# Patient Record
Sex: FEMALE | Race: BLACK OR AFRICAN AMERICAN | Employment: FULL TIME | URBAN - METROPOLITAN AREA
[De-identification: names, ages, dates, MRNs, and addresses within clinical notes are randomized per-mention and may not be internally consistent; named-entity substitution may affect disease eponyms.]

---

## 2017-03-03 NOTE — TELEPHONE ENCOUNTER
please advise as does not meet RN protocol for refill criteria - lab results not on file.         Hypertensive Medications  Protocol Criteria:  · Appointment scheduled in the past 6 months or in the next 3 months  · BMP or CMP in the past 12 months  · Crea

## 2017-03-05 RX ORDER — AMLODIPINE BESYLATE 2.5 MG/1
TABLET ORAL
Qty: 90 TABLET | Refills: 1 | Status: SHIPPED | OUTPATIENT
Start: 2017-03-05 | End: 2017-10-09

## 2017-09-11 ENCOUNTER — OFFICE VISIT (OUTPATIENT)
Dept: INTERNAL MEDICINE CLINIC | Facility: CLINIC | Age: 57
End: 2017-09-11

## 2017-09-11 VITALS
HEART RATE: 71 BPM | SYSTOLIC BLOOD PRESSURE: 135 MMHG | DIASTOLIC BLOOD PRESSURE: 85 MMHG | HEIGHT: 64 IN | BODY MASS INDEX: 24.61 KG/M2 | WEIGHT: 144.13 LBS | TEMPERATURE: 98 F

## 2017-09-11 DIAGNOSIS — Z00.00 ANNUAL PHYSICAL EXAM: Primary | ICD-10-CM

## 2017-09-11 PROCEDURE — 99396 PREV VISIT EST AGE 40-64: CPT | Performed by: INTERNAL MEDICINE

## 2017-09-11 NOTE — PROGRESS NOTES
HPI:    Patient ID: Rufina Sanchez is a 56year old female. HPI  Annual Preventative Exam  Patient arrives today for annual preventative physical examination. The patient complains of no new problems and has 0/10 pain.        Review of Systems   Consti exhibits no tenderness. Abdominal: Soft. Bowel sounds are normal. She exhibits no distension and no mass. There is no tenderness. There is no rebound and no guarding. Musculoskeletal: Normal range of motion.  She exhibits no edema (lower extermities , + performance and is accurate and complete.   Alison Cisneros MD, 9/11/2017, 12:29 PM

## 2017-10-11 RX ORDER — AMLODIPINE BESYLATE 2.5 MG/1
TABLET ORAL
Qty: 90 TABLET | Refills: 0 | Status: SHIPPED | OUTPATIENT
Start: 2017-10-11 | End: 2018-01-02

## 2018-01-05 NOTE — TELEPHONE ENCOUNTER
Call Center please assist with appt scheduling. Patient needs to schedule office visit to establish care with new provider for any additional refills. Also no lab work on file. Labs were ordered in September but never completed.       Hypertensive Medicatio

## 2018-01-06 NOTE — TELEPHONE ENCOUNTER
Patient has scheduled visit with Dr. Kory Jerez for 01/27/2018 to establish care. Patient was not able to schedule for earlier date due to work conflicts.      Patient is almost out of medication

## 2018-01-07 RX ORDER — AMLODIPINE BESYLATE 2.5 MG/1
TABLET ORAL
Qty: 30 TABLET | Refills: 0 | Status: SHIPPED | OUTPATIENT
Start: 2018-01-07 | End: 2018-01-27

## 2018-01-27 ENCOUNTER — OFFICE VISIT (OUTPATIENT)
Dept: INTERNAL MEDICINE CLINIC | Facility: CLINIC | Age: 58
End: 2018-01-27

## 2018-01-27 ENCOUNTER — LAB ENCOUNTER (OUTPATIENT)
Dept: LAB | Facility: HOSPITAL | Age: 58
End: 2018-01-27
Attending: INTERNAL MEDICINE
Payer: COMMERCIAL

## 2018-01-27 VITALS
DIASTOLIC BLOOD PRESSURE: 88 MMHG | TEMPERATURE: 98 F | SYSTOLIC BLOOD PRESSURE: 124 MMHG | HEART RATE: 71 BPM | HEIGHT: 64 IN | BODY MASS INDEX: 24.59 KG/M2 | WEIGHT: 144 LBS

## 2018-01-27 DIAGNOSIS — Z00.00 ANNUAL PHYSICAL EXAM: ICD-10-CM

## 2018-01-27 DIAGNOSIS — I10 ESSENTIAL HYPERTENSION: Primary | ICD-10-CM

## 2018-01-27 LAB
ALBUMIN SERPL BCP-MCNC: 4.6 G/DL (ref 3.5–4.8)
ALBUMIN/GLOB SERPL: 1.4 {RATIO} (ref 1–2)
ALP SERPL-CCNC: 61 U/L (ref 32–100)
ALT SERPL-CCNC: 27 U/L (ref 14–54)
ANION GAP SERPL CALC-SCNC: 7 MMOL/L (ref 0–18)
AST SERPL-CCNC: 22 U/L (ref 15–41)
BASOPHILS # BLD: 0 K/UL (ref 0–0.2)
BASOPHILS NFR BLD: 1 %
BILIRUB SERPL-MCNC: 0.8 MG/DL (ref 0.3–1.2)
BUN SERPL-MCNC: 13 MG/DL (ref 8–20)
BUN/CREAT SERPL: 17.6 (ref 10–20)
CALCIUM SERPL-MCNC: 10 MG/DL (ref 8.5–10.5)
CHLORIDE SERPL-SCNC: 107 MMOL/L (ref 95–110)
CO2 SERPL-SCNC: 26 MMOL/L (ref 22–32)
CREAT SERPL-MCNC: 0.74 MG/DL (ref 0.5–1.5)
CREAT UR-MCNC: 141.7 MG/DL
EOSINOPHIL # BLD: 0.1 K/UL (ref 0–0.7)
EOSINOPHIL NFR BLD: 2 %
ERYTHROCYTE [DISTWIDTH] IN BLOOD BY AUTOMATED COUNT: 14.1 % (ref 11–15)
GLOBULIN PLAS-MCNC: 3.2 G/DL (ref 2.5–3.7)
GLUCOSE SERPL-MCNC: 112 MG/DL (ref 70–99)
HBA1C MFR BLD: 6.4 % (ref 4–6)
HCT VFR BLD AUTO: 44.3 % (ref 35–48)
HGB BLD-MCNC: 14.5 G/DL (ref 12–16)
LYMPHOCYTES # BLD: 3 K/UL (ref 1–4)
LYMPHOCYTES NFR BLD: 49 %
MCH RBC QN AUTO: 27.4 PG (ref 27–32)
MCHC RBC AUTO-ENTMCNC: 32.8 G/DL (ref 32–37)
MCV RBC AUTO: 83.5 FL (ref 80–100)
MICROALBUMIN UR-MCNC: 0.4 MG/DL (ref 0–1.8)
MICROALBUMIN/CREAT UR: 2.8 MG/G{CREAT} (ref 0–20)
MONOCYTES # BLD: 0.5 K/UL (ref 0–1)
MONOCYTES NFR BLD: 8 %
NEUTROPHILS # BLD AUTO: 2.5 K/UL (ref 1.8–7.7)
NEUTROPHILS NFR BLD: 41 %
OSMOLALITY UR CALC.SUM OF ELEC: 291 MOSM/KG (ref 275–295)
PLATELET # BLD AUTO: 203 K/UL (ref 140–400)
PMV BLD AUTO: 8.5 FL (ref 7.4–10.3)
POTASSIUM SERPL-SCNC: 4.1 MMOL/L (ref 3.3–5.1)
PROT SERPL-MCNC: 7.8 G/DL (ref 5.9–8.4)
RBC # BLD AUTO: 5.3 M/UL (ref 3.7–5.4)
SODIUM SERPL-SCNC: 140 MMOL/L (ref 136–144)
TSH SERPL-ACNC: 1.27 UIU/ML (ref 0.45–5.33)
WBC # BLD AUTO: 6.1 K/UL (ref 4–11)

## 2018-01-27 PROCEDURE — 85025 COMPLETE CBC W/AUTO DIFF WBC: CPT

## 2018-01-27 PROCEDURE — 99213 OFFICE O/P EST LOW 20 MIN: CPT | Performed by: INTERNAL MEDICINE

## 2018-01-27 PROCEDURE — 82306 VITAMIN D 25 HYDROXY: CPT

## 2018-01-27 PROCEDURE — 99212 OFFICE O/P EST SF 10 MIN: CPT | Performed by: INTERNAL MEDICINE

## 2018-01-27 PROCEDURE — 83036 HEMOGLOBIN GLYCOSYLATED A1C: CPT

## 2018-01-27 PROCEDURE — 36415 COLL VENOUS BLD VENIPUNCTURE: CPT

## 2018-01-27 PROCEDURE — 80053 COMPREHEN METABOLIC PANEL: CPT

## 2018-01-27 PROCEDURE — 84443 ASSAY THYROID STIM HORMONE: CPT

## 2018-01-27 PROCEDURE — 82570 ASSAY OF URINE CREATININE: CPT

## 2018-01-27 PROCEDURE — 82043 UR ALBUMIN QUANTITATIVE: CPT

## 2018-01-27 RX ORDER — AMLODIPINE BESYLATE 2.5 MG/1
2.5 TABLET ORAL
Qty: 90 TABLET | Refills: 1 | Status: SHIPPED | OUTPATIENT
Start: 2018-01-27 | End: 2019-02-18

## 2018-01-27 NOTE — PROGRESS NOTES
Lokesh Torres is a 56year old female. Patient presents with:  Hypertension      HPI:   68-year-old pleasant female here to establish care. Former patient of Dr. Leatha Gallgaher. She has  history of hypertension. She is on amlodipine 2.5 mg daily.  She reque polyuria, polydipsia, tremors. HEME: no anemia, no abnormal bleeding or easy bruising. PSYCH: Denies anxiety or feeling depressed.         EXAM:   /88 (BP Location: Right arm, Cuff Size: adult)   Pulse 71   Temp 98 °F (36.7 °C) (Oral)   Ht 5' 4\" (1

## 2018-01-29 PROBLEM — R73.03 PREDIABETES: Status: ACTIVE | Noted: 2018-01-29

## 2018-01-29 LAB — 25(OH)D3 SERPL-MCNC: 42.4 NG/ML

## 2018-08-14 NOTE — TELEPHONE ENCOUNTER
Failed protocol, please advise.   IN    Hypertensive Medications  Protocol Criteria:  · Appointment scheduled in the past 6 months or in the next 3 months  · BMP or CMP in the past 12 months  · Creatinine result < 2  Recent Outpatient Visits

## 2018-08-15 RX ORDER — AMLODIPINE BESYLATE 2.5 MG/1
TABLET ORAL
Qty: 90 TABLET | Refills: 0 | Status: SHIPPED | OUTPATIENT
Start: 2018-08-15 | End: 2018-11-15

## 2018-11-14 NOTE — TELEPHONE ENCOUNTER
Current Outpatient Medications:  AMLODIPINE BESYLATE 2.5 MG Oral Tab TAKE 1 TABLET BY MOUTH EVERY DAY Disp: 90 tablet Rfl: 0   e

## 2018-11-15 RX ORDER — AMLODIPINE BESYLATE 2.5 MG/1
2.5 TABLET ORAL
Qty: 90 TABLET | Refills: 0 | Status: SHIPPED | OUTPATIENT
Start: 2018-11-15 | End: 2018-11-19

## 2018-11-15 NOTE — TELEPHONE ENCOUNTER
Please review; protocol failed.     Hypertensive Medications  Protocol Criteria:  · Appointment scheduled in the past 6 months or in the next 3 months  · BMP or CMP in the past 12 months  · Creatinine result < 2  Recent Outpatient Visits            9 months

## 2018-11-19 ENCOUNTER — TELEPHONE (OUTPATIENT)
Dept: OTHER | Age: 58
End: 2018-11-19

## 2018-11-19 RX ORDER — AMLODIPINE BESYLATE 2.5 MG/1
2.5 TABLET ORAL
Qty: 90 TABLET | Refills: 0 | Status: SHIPPED | OUTPATIENT
Start: 2018-11-19 | End: 2019-02-10

## 2019-02-12 RX ORDER — AMLODIPINE BESYLATE 2.5 MG/1
TABLET ORAL
Qty: 90 TABLET | Refills: 0 | Status: SHIPPED | OUTPATIENT
Start: 2019-02-12 | End: 2019-05-12

## 2019-02-12 NOTE — TELEPHONE ENCOUNTER
Review pended refill request as it does not fall under a protocol.     Last Rx:11-19-18  LOV: 1-27-18

## 2019-02-13 ENCOUNTER — TELEPHONE (OUTPATIENT)
Dept: INTERNAL MEDICINE CLINIC | Facility: CLINIC | Age: 59
End: 2019-02-13

## 2019-02-13 DIAGNOSIS — R73.03 PREDIABETES: Primary | ICD-10-CM

## 2019-02-13 DIAGNOSIS — I10 ESSENTIAL HYPERTENSION: ICD-10-CM

## 2019-02-13 DIAGNOSIS — Z00.00 ANNUAL PHYSICAL EXAM: ICD-10-CM

## 2019-02-13 NOTE — TELEPHONE ENCOUNTER
Pt requesting blood work orders before Px on 2/18/19    Please confirm if pt needs to fast, and let her know when the orders have been placed.     Call back number [phone]

## 2019-02-18 ENCOUNTER — OFFICE VISIT (OUTPATIENT)
Dept: INTERNAL MEDICINE CLINIC | Facility: CLINIC | Age: 59
End: 2019-02-18
Payer: COMMERCIAL

## 2019-02-18 ENCOUNTER — LAB ENCOUNTER (OUTPATIENT)
Dept: LAB | Facility: HOSPITAL | Age: 59
End: 2019-02-18
Attending: INTERNAL MEDICINE
Payer: COMMERCIAL

## 2019-02-18 VITALS
SYSTOLIC BLOOD PRESSURE: 147 MMHG | DIASTOLIC BLOOD PRESSURE: 86 MMHG | TEMPERATURE: 98 F | HEART RATE: 82 BPM | BODY MASS INDEX: 25.04 KG/M2 | WEIGHT: 150.31 LBS | HEIGHT: 65 IN

## 2019-02-18 DIAGNOSIS — Z00.00 ANNUAL PHYSICAL EXAM: Primary | ICD-10-CM

## 2019-02-18 DIAGNOSIS — Z00.00 ANNUAL PHYSICAL EXAM: ICD-10-CM

## 2019-02-18 DIAGNOSIS — I10 ESSENTIAL HYPERTENSION: ICD-10-CM

## 2019-02-18 DIAGNOSIS — R73.03 PREDIABETES: ICD-10-CM

## 2019-02-18 DIAGNOSIS — Z12.4 SCREENING FOR CERVICAL CANCER: ICD-10-CM

## 2019-02-18 DIAGNOSIS — Z12.39 SCREENING FOR BREAST CANCER: ICD-10-CM

## 2019-02-18 DIAGNOSIS — Z23 NEED FOR VACCINATION: ICD-10-CM

## 2019-02-18 PROBLEM — Z90.2 S/P PARTIAL LOBECTOMY OF LUNG: Status: ACTIVE | Noted: 2019-02-18

## 2019-02-18 LAB
ALBUMIN SERPL-MCNC: 4.3 G/DL (ref 3.4–5)
ALBUMIN/GLOB SERPL: 1.1 {RATIO} (ref 1–2)
ALP LIVER SERPL-CCNC: 66 U/L (ref 46–118)
ALT SERPL-CCNC: 28 U/L (ref 13–56)
ANION GAP SERPL CALC-SCNC: 7 MMOL/L (ref 0–18)
AST SERPL-CCNC: 16 U/L (ref 15–37)
BASOPHILS # BLD AUTO: 0.03 X10(3) UL (ref 0–0.2)
BASOPHILS NFR BLD AUTO: 0.4 %
BILIRUB SERPL-MCNC: 0.5 MG/DL (ref 0.1–2)
BUN BLD-MCNC: 11 MG/DL (ref 7–18)
BUN/CREAT SERPL: 13.4 (ref 10–20)
CALCIUM BLD-MCNC: 9.8 MG/DL (ref 8.5–10.1)
CHLORIDE SERPL-SCNC: 105 MMOL/L (ref 98–107)
CHOLEST SMN-MCNC: 181 MG/DL (ref ?–200)
CO2 SERPL-SCNC: 28 MMOL/L (ref 21–32)
CREAT BLD-MCNC: 0.82 MG/DL (ref 0.55–1.02)
DEPRECATED RDW RBC AUTO: 41.8 FL (ref 35.1–46.3)
EOSINOPHIL # BLD AUTO: 0.06 X10(3) UL (ref 0–0.7)
EOSINOPHIL NFR BLD AUTO: 0.9 %
ERYTHROCYTE [DISTWIDTH] IN BLOOD BY AUTOMATED COUNT: 13.8 % (ref 11–15)
EST. AVERAGE GLUCOSE BLD GHB EST-MCNC: 146 MG/DL (ref 68–126)
GLOBULIN PLAS-MCNC: 3.9 G/DL (ref 2.8–4.4)
GLUCOSE BLD-MCNC: 94 MG/DL (ref 70–99)
HBA1C MFR BLD HPLC: 6.7 % (ref ?–5.7)
HCT VFR BLD AUTO: 45 % (ref 35–48)
HDLC SERPL-MCNC: 58 MG/DL (ref 40–59)
HGB BLD-MCNC: 14.6 G/DL (ref 12–16)
IMM GRANULOCYTES # BLD AUTO: 0.02 X10(3) UL (ref 0–1)
IMM GRANULOCYTES NFR BLD: 0.3 %
LDLC SERPL CALC-MCNC: 93 MG/DL (ref ?–100)
LYMPHOCYTES # BLD AUTO: 2.72 X10(3) UL (ref 1–4)
LYMPHOCYTES NFR BLD AUTO: 39.2 %
M PROTEIN MFR SERPL ELPH: 8.2 G/DL (ref 6.4–8.2)
MCH RBC QN AUTO: 27.2 PG (ref 26–34)
MCHC RBC AUTO-ENTMCNC: 32.4 G/DL (ref 31–37)
MCV RBC AUTO: 83.8 FL (ref 80–100)
MONOCYTES # BLD AUTO: 0.56 X10(3) UL (ref 0.1–1)
MONOCYTES NFR BLD AUTO: 8.1 %
NEUTROPHILS # BLD AUTO: 3.54 X10 (3) UL (ref 1.5–7.7)
NEUTROPHILS # BLD AUTO: 3.54 X10(3) UL (ref 1.5–7.7)
NEUTROPHILS NFR BLD AUTO: 51.1 %
NONHDLC SERPL-MCNC: 123 MG/DL (ref ?–130)
OSMOLALITY SERPL CALC.SUM OF ELEC: 289 MOSM/KG (ref 275–295)
PLATELET # BLD AUTO: 260 10(3)UL (ref 150–450)
POTASSIUM SERPL-SCNC: 3.7 MMOL/L (ref 3.5–5.1)
RBC # BLD AUTO: 5.37 X10(6)UL (ref 3.8–5.3)
SODIUM SERPL-SCNC: 140 MMOL/L (ref 136–145)
TRIGL SERPL-MCNC: 151 MG/DL (ref 30–149)
TSI SER-ACNC: 3.29 MIU/ML (ref 0.36–3.74)
WBC # BLD AUTO: 6.9 X10(3) UL (ref 4–11)

## 2019-02-18 PROCEDURE — 84443 ASSAY THYROID STIM HORMONE: CPT

## 2019-02-18 PROCEDURE — 80053 COMPREHEN METABOLIC PANEL: CPT

## 2019-02-18 PROCEDURE — 90715 TDAP VACCINE 7 YRS/> IM: CPT | Performed by: INTERNAL MEDICINE

## 2019-02-18 PROCEDURE — 99396 PREV VISIT EST AGE 40-64: CPT | Performed by: INTERNAL MEDICINE

## 2019-02-18 PROCEDURE — 80061 LIPID PANEL: CPT

## 2019-02-18 PROCEDURE — 90471 IMMUNIZATION ADMIN: CPT | Performed by: INTERNAL MEDICINE

## 2019-02-18 PROCEDURE — 83036 HEMOGLOBIN GLYCOSYLATED A1C: CPT

## 2019-02-18 PROCEDURE — 85025 COMPLETE CBC W/AUTO DIFF WBC: CPT

## 2019-02-18 PROCEDURE — 36415 COLL VENOUS BLD VENIPUNCTURE: CPT

## 2019-02-18 RX ORDER — FAMOTIDINE 20 MG
TABLET ORAL
COMMUNITY

## 2019-02-18 NOTE — PROGRESS NOTES
Candida Lay is a 66year old female. Patient presents with:  Physical      HPI:     HPI  Patient is here for physical.  History of prediabetes and hypertension. Her blood pressure is elevated today. On amlodipine 2.5 mg.   She did not take her blood Cardiovascular: Negative for chest pain, palpitations and leg swelling. Gastrointestinal: Negative for abdominal pain, blood in stool, constipation, diarrhea and heartburn. Genitourinary: Negative for dysuria and frequency.    Musculoskeletal: Jennifer Dweyr motion. She exhibits no edema or tenderness. Lymphadenopathy:     She has no cervical adenopathy. Neurological: She is alert and oriented to person, place, and time. She has normal reflexes. No cranial nerve deficit. Skin: Skin is warm and dry.  No ra 6 months. Need for vaccination  -     TETANUS, DIPHTHERIA TOXOIDS AND ACELLULAR PERTUSIS VACCINE (TDAP), >7 YEARS, IM USE    Screening for breast cancer  -     [facility] SCREENING BILAT (CPT=77067);  Future    Screening for cervical cancer  -     THINPREP PAP

## 2019-02-20 LAB — HPV I/H RISK 1 DNA SPEC QL NAA+PROBE: NEGATIVE

## 2019-05-13 RX ORDER — AMLODIPINE BESYLATE 2.5 MG/1
TABLET ORAL
Qty: 90 TABLET | Refills: 1 | Status: SHIPPED | OUTPATIENT
Start: 2019-05-13 | End: 2019-11-04

## 2019-11-04 NOTE — TELEPHONE ENCOUNTER
Current Outpatient Medications   Medication Sig Dispense Refill   • AMLODIPINE BESYLATE 2.5 MG Oral Tab TAKE 1 TABLET BY MOUTH ONCE DAILY 90 tablet 1

## 2019-11-06 RX ORDER — AMLODIPINE BESYLATE 2.5 MG/1
2.5 TABLET ORAL
Qty: 90 TABLET | Refills: 1 | Status: SHIPPED | OUTPATIENT
Start: 2019-11-06 | End: 2020-05-05

## 2019-11-06 NOTE — TELEPHONE ENCOUNTER
Dr. Nusrat Rubio for Dr. Renée Mendiola, please advise. Call center please assist with appt scheduling to reestablish care.

## 2020-02-18 ENCOUNTER — TELEPHONE (OUTPATIENT)
Dept: INTERNAL MEDICINE CLINIC | Facility: CLINIC | Age: 60
End: 2020-02-18

## 2020-02-18 NOTE — TELEPHONE ENCOUNTER
Thank you Dr. Karthik Mcmahon. Patient is coming in on 4/4 at the [facility] location. Thank you.

## 2020-02-18 NOTE — TELEPHONE ENCOUNTER
Patient is a former patient of Dr. Ronn White and she is requesting to see you on a Saturdays for a physical at the [facility]. You are available on April 4 (Saturday), but unfortunately, physical slots are not available. May I put this patient in for a physical on April 4 (Saturday)? Please confirm. Thank you. (Call center, once Dr replies please send a message to the patient. Thank you).

## 2020-05-05 RX ORDER — AMLODIPINE BESYLATE 2.5 MG/1
TABLET ORAL
Qty: 90 TABLET | Refills: 1 | Status: SHIPPED | OUTPATIENT
Start: 2020-05-05 | End: 2020-11-05

## 2020-11-01 RX ORDER — AMLODIPINE BESYLATE 2.5 MG/1
TABLET ORAL
Qty: 90 TABLET | Refills: 1 | OUTPATIENT
Start: 2020-11-01

## 2020-11-01 NOTE — TELEPHONE ENCOUNTER
Never seen, last seen by Dr Carrillo Archuleta. She needs appt  If she is following up with another organization, she needs to inform pharmacy. if she is following up with us, she needs either an OV or telemed visit

## 2020-11-05 ENCOUNTER — OFFICE VISIT (OUTPATIENT)
Dept: INTERNAL MEDICINE CLINIC | Facility: CLINIC | Age: 60
End: 2020-11-05
Payer: COMMERCIAL

## 2020-11-05 ENCOUNTER — LAB ENCOUNTER (OUTPATIENT)
Dept: LAB | Facility: HOSPITAL | Age: 60
End: 2020-11-05
Attending: INTERNAL MEDICINE
Payer: COMMERCIAL

## 2020-11-05 VITALS
SYSTOLIC BLOOD PRESSURE: 146 MMHG | HEIGHT: 64 IN | WEIGHT: 144 LBS | HEART RATE: 87 BPM | DIASTOLIC BLOOD PRESSURE: 83 MMHG | OXYGEN SATURATION: 98 % | TEMPERATURE: 98 F | BODY MASS INDEX: 24.59 KG/M2

## 2020-11-05 DIAGNOSIS — Z00.00 ADULT GENERAL MEDICAL EXAM: ICD-10-CM

## 2020-11-05 DIAGNOSIS — E78.1 HYPERTRIGLYCERIDEMIA: ICD-10-CM

## 2020-11-05 DIAGNOSIS — E11.9 TYPE 2 DIABETES MELLITUS WITHOUT COMPLICATION, WITHOUT LONG-TERM CURRENT USE OF INSULIN (HCC): Primary | ICD-10-CM

## 2020-11-05 DIAGNOSIS — Z12.31 BREAST CANCER SCREENING BY MAMMOGRAM: ICD-10-CM

## 2020-11-05 DIAGNOSIS — I10 ESSENTIAL HYPERTENSION: ICD-10-CM

## 2020-11-05 PROCEDURE — 84443 ASSAY THYROID STIM HORMONE: CPT

## 2020-11-05 PROCEDURE — 83036 HEMOGLOBIN GLYCOSYLATED A1C: CPT

## 2020-11-05 PROCEDURE — 82570 ASSAY OF URINE CREATININE: CPT

## 2020-11-05 PROCEDURE — 80053 COMPREHEN METABOLIC PANEL: CPT

## 2020-11-05 PROCEDURE — 99214 OFFICE O/P EST MOD 30 MIN: CPT | Performed by: INTERNAL MEDICINE

## 2020-11-05 PROCEDURE — 99072 ADDL SUPL MATRL&STAF TM PHE: CPT | Performed by: INTERNAL MEDICINE

## 2020-11-05 PROCEDURE — 3079F DIAST BP 80-89 MM HG: CPT | Performed by: INTERNAL MEDICINE

## 2020-11-05 PROCEDURE — 85027 COMPLETE CBC AUTOMATED: CPT

## 2020-11-05 PROCEDURE — 82043 UR ALBUMIN QUANTITATIVE: CPT

## 2020-11-05 PROCEDURE — 3077F SYST BP >= 140 MM HG: CPT | Performed by: INTERNAL MEDICINE

## 2020-11-05 PROCEDURE — 80061 LIPID PANEL: CPT

## 2020-11-05 PROCEDURE — 36415 COLL VENOUS BLD VENIPUNCTURE: CPT

## 2020-11-05 PROCEDURE — 3008F BODY MASS INDEX DOCD: CPT | Performed by: INTERNAL MEDICINE

## 2020-11-05 RX ORDER — AMLODIPINE BESYLATE 2.5 MG/1
2.5 TABLET ORAL DAILY
Qty: 90 TABLET | Refills: 1 | Status: SHIPPED | OUTPATIENT
Start: 2020-11-05 | End: 2020-11-05

## 2020-11-05 NOTE — PROGRESS NOTES
Abbi Pedersen is a 64year old female. Patient presents with:  Checkup: medication question    HPI:   46-year-old pleasant lady with a past medical history of hypertension, diabetes, hypertriglyceridemia here for follow-up.   She was following up with Dr. Freddy Edmond Negative for chest pain. Gastrointestinal: Negative for vomiting, abdominal pain and abdominal distention. Genitourinary: Negative for hematuria. Skin: Negative for wound. Psychiatric/Behavioral: Negative for behavioral problems.      [employer] fr does not want to be on a statins now. I have given her referral for ophthalmology. She wants me to check her hemoglobin A1c.   She promised me that she will be acceptable for medication if her hemoglobin A1c is more than 7.  - OPHTHALMOLOGY - INTERNAL

## 2020-11-06 ENCOUNTER — TELEPHONE (OUTPATIENT)
Dept: INTERNAL MEDICINE CLINIC | Facility: CLINIC | Age: 60
End: 2020-11-06

## 2020-11-06 RX ORDER — AMLODIPINE BESYLATE 2.5 MG/1
2.5 TABLET ORAL DAILY
Qty: 90 TABLET | Refills: 1 | OUTPATIENT
Start: 2020-11-06

## 2020-11-06 RX ORDER — AMLODIPINE BESYLATE 2.5 MG/1
2.5 TABLET ORAL DAILY
Qty: 90 TABLET | Refills: 1 | Status: SHIPPED | OUTPATIENT
Start: 2020-11-06 | End: 2021-02-03

## 2020-11-06 NOTE — TELEPHONE ENCOUNTER
Patient was seen yesterday and wanted to know if her Amlodipine was sent to pharmacy.  Verified with patient that prescription was sent 11/5/20 at 10:28 am. Patient verbalized understanding

## 2021-02-03 RX ORDER — AMLODIPINE BESYLATE 2.5 MG/1
TABLET ORAL
Qty: 90 TABLET | Refills: 1 | Status: SHIPPED | OUTPATIENT
Start: 2021-02-03 | End: 2022-01-05

## 2021-02-10 ENCOUNTER — OFFICE VISIT (OUTPATIENT)
Dept: OPHTHALMOLOGY | Facility: CLINIC | Age: 61
End: 2021-02-10
Payer: COMMERCIAL

## 2021-02-10 DIAGNOSIS — H52.13 MYOPIA OF BOTH EYES WITH ASTIGMATISM AND PRESBYOPIA: Primary | ICD-10-CM

## 2021-02-10 DIAGNOSIS — R73.03 PREDIABETES: ICD-10-CM

## 2021-02-10 DIAGNOSIS — H25.13 AGE-RELATED NUCLEAR CATARACT OF BOTH EYES: ICD-10-CM

## 2021-02-10 DIAGNOSIS — H52.203 MYOPIA OF BOTH EYES WITH ASTIGMATISM AND PRESBYOPIA: Primary | ICD-10-CM

## 2021-02-10 DIAGNOSIS — H52.4 MYOPIA OF BOTH EYES WITH ASTIGMATISM AND PRESBYOPIA: Primary | ICD-10-CM

## 2021-02-10 PROCEDURE — 92015 DETERMINE REFRACTIVE STATE: CPT | Performed by: OPHTHALMOLOGY

## 2021-02-10 PROCEDURE — 99243 OFF/OP CNSLTJ NEW/EST LOW 30: CPT | Performed by: OPHTHALMOLOGY

## 2021-02-10 NOTE — ASSESSMENT & PLAN NOTE
Prediabetes: no background of retinopathy, no signs of neovascularization noted. Discussed ocular and systemic benefits of blood sugar control. Diagnosis and treatment discussed in detail with patient.

## 2021-02-10 NOTE — PROGRESS NOTES
Elder Smith is a 64year old female.     HPI:     HPI     Consult      Additional comments: Per Dr. Evan Bain has a referral from PCP Dr. Dalila Snow for a diabetic eye exam.  (Last A1C 6.8 on 11/5/2020)  Pt states that she Near sc 20/25 20/25    Near cc 20/40 20/40    Correction: Glasses          Tonometry ([facility], 9:13 AM)       Right Left    Pressure 17 18          Pupils       Pupils    Right PERRL    Left PERRL          Visual Fields       Left Right     Full Full Diagnosis and treatment discussed in detail with patient. Age-related nuclear cataract of both eyes  Discussed very mild cataracts in both eyes that are not affecting vision and are not surgical at this time.     Reassured patient that other than herbieac

## 2021-02-10 NOTE — PATIENT INSTRUCTIONS
Myopia of both eyes with astigmatism and presbyopia  RX for separate distance and reading glasses per patient's choice. Prediabetes  Prediabetes: no background of retinopathy, no signs of neovascularization noted.   Discussed ocular and systemic benef

## 2021-02-10 NOTE — ASSESSMENT & PLAN NOTE
Discussed very mild cataracts in both eyes that are not affecting vision and are not surgical at this time. Reassured patient that other than cataracts, eyes look very healthy; there is no evidence of glaucoma or macular degeneration in either eye.

## 2021-03-25 ENCOUNTER — PATIENT MESSAGE (OUTPATIENT)
Dept: INTERNAL MEDICINE CLINIC | Facility: CLINIC | Age: 61
End: 2021-03-25

## 2021-03-25 NOTE — TELEPHONE ENCOUNTER
From: Marline Lorenzana  To: Syed Rosario MD  Sent: 3/25/2021 2:22 PM CDT  Subject: Other    Good afternoon I was wondering if you are taking appointments for the vaccine shot    please advise  thanks

## 2021-03-30 ENCOUNTER — TELEPHONE (OUTPATIENT)
Dept: INTERNAL MEDICINE CLINIC | Facility: CLINIC | Age: 61
End: 2021-03-30

## 2021-03-30 NOTE — TELEPHONE ENCOUNTER
Spoke with patient and advised to completed her Mammogram and also offer her an appt. For her Physical.Patient denied appt, she will call back to schedule an appt. After she receives both of her Covid Vaccines.

## 2021-07-12 ENCOUNTER — TELEPHONE (OUTPATIENT)
Dept: INTERNAL MEDICINE CLINIC | Facility: CLINIC | Age: 61
End: 2021-07-12

## 2021-07-12 NOTE — TELEPHONE ENCOUNTER
Pt states the pharmacy told her the dosage for her B/P medication changed. Per chart pt has been taking Amlodipine Besylate 2.5 mg daily. Pt confirms this is the dosage she has been taking.     Confirmed with the pharmacy that there has been no change p

## 2021-08-27 ENCOUNTER — TELEPHONE (OUTPATIENT)
Dept: INTERNAL MEDICINE CLINIC | Facility: CLINIC | Age: 61
End: 2021-08-27

## 2021-08-27 NOTE — TELEPHONE ENCOUNTER
Patient is due for mammography. Please call patient and encourage her to complete her mammography in her earliest convenience.   Thank you

## 2022-01-05 RX ORDER — AMLODIPINE BESYLATE 2.5 MG/1
2.5 TABLET ORAL DAILY
Qty: 90 TABLET | Refills: 0 | Status: SHIPPED | OUTPATIENT
Start: 2022-01-05

## 2022-01-06 NOTE — TELEPHONE ENCOUNTER
Last seen in November 2020. I have given her a 90-day supply. She needs an appointment to see me in the next 60 days.   Please inform her thank you

## 2022-02-25 ENCOUNTER — PATIENT MESSAGE (OUTPATIENT)
Dept: INTERNAL MEDICINE CLINIC | Facility: CLINIC | Age: 62
End: 2022-02-25

## 2022-02-26 NOTE — TELEPHONE ENCOUNTER
Steve Ovalle RN 2/25/2022 5:18 PM CST        ----- Message -----  From: Jenni Harris  Sent: 2/25/2022 3:18 PM CST  To: Em Rn Triage  Subject: DOCTORS APPT     Hi just letting you know that I will be Scheduling an appointment sometime in March for my physcial    thank you

## 2022-04-07 NOTE — TELEPHONE ENCOUNTER
Please Review. Protocol Failed or has no protocol.        Requested Prescriptions   Pending Prescriptions Disp Refills    AMLODIPINE 2.5 MG Oral Tab [Pharmacy Med Name: AMLODIPINE BESYLATE 2.5MG TABLETS] 90 tablet 0     Sig: TAKE 1 TABLET(2.5 MG) BY MOUTH DAILY        Hypertensive Medications Protocol Failed - 4/7/2022  2:47 PM        Failed - CMP or BMP in past 12 months        Passed - Appointment in past 6 or next 3 months        Passed - GFR  > 50     Lab Results   Component Value Date    [facility] 91 11/05/2020                    AMLODIPINE 2.5 MG Oral Tab [Pharmacy Med Name: AMLODIPINE BESYLATE 2.5MG TABLETS] 90 tablet 0     Sig: TAKE 1 TABLET(2.5 MG) BY MOUTH DAILY        Hypertensive Medications Protocol Failed - 4/7/2022  2:47 PM        Failed - CMP or BMP in past 12 months        Passed - Appointment in past 6 or next 3 months        Passed - GFR  > 50     Lab Results   Component Value Date    [facility] 91 11/05/2020                     Recent Outpatient Visits              1 year ago Myopia of both eyes with astigmatism and presbyopia    [facility] Brian Lobo MD    Office Visit    1 year ago Type 2 diabetes mellitus without complication, without long-term current use of insulin [facility], [facility], [address], Nancy Hurley MD    Office Visit    3 years ago Annual physical exam    [facility], Axel Reaves MD    Office Visit    4 years ago Essential hypertension    [facility], [address], Axel Reaves MD    Office Visit    4 years ago Annual physical exam    Layne Crowell MD    Office Visit          Future Appointments         Provider Department Appt Notes    In 3 weeks Hanna Leung MD [facility], [facility], [address], [place] px; mask and care policy informed

## 2022-04-08 RX ORDER — AMLODIPINE BESYLATE 2.5 MG/1
TABLET ORAL
Qty: 90 TABLET | Refills: 0 | Status: SHIPPED | OUTPATIENT
Start: 2022-04-08 | End: 2022-05-04

## 2022-04-08 RX ORDER — AMLODIPINE BESYLATE 2.5 MG/1
TABLET ORAL
Qty: 90 TABLET | Refills: 0 | Status: CANCELLED | OUTPATIENT
Start: 2022-04-08

## 2022-05-04 ENCOUNTER — OFFICE VISIT (OUTPATIENT)
Dept: INTERNAL MEDICINE CLINIC | Facility: CLINIC | Age: 62
End: 2022-05-04
Payer: COMMERCIAL

## 2022-05-04 ENCOUNTER — LAB ENCOUNTER (OUTPATIENT)
Dept: LAB | Facility: HOSPITAL | Age: 62
End: 2022-05-04
Attending: INTERNAL MEDICINE
Payer: COMMERCIAL

## 2022-05-04 ENCOUNTER — HOSPITAL ENCOUNTER (OUTPATIENT)
Dept: MAMMOGRAPHY | Facility: HOSPITAL | Age: 62
Discharge: HOME | End: 2022-05-04
Attending: INTERNAL MEDICINE
Payer: COMMERCIAL

## 2022-05-04 VITALS
DIASTOLIC BLOOD PRESSURE: 86 MMHG | BODY MASS INDEX: 23.73 KG/M2 | HEIGHT: 64 IN | WEIGHT: 139 LBS | SYSTOLIC BLOOD PRESSURE: 136 MMHG | RESPIRATION RATE: 14 BRPM | OXYGEN SATURATION: 98 % | HEART RATE: 88 BPM

## 2022-05-04 DIAGNOSIS — Z00.00 ADULT GENERAL MEDICAL EXAM: Primary | ICD-10-CM

## 2022-05-04 DIAGNOSIS — Z12.31 BREAST CANCER SCREENING BY MAMMOGRAM: ICD-10-CM

## 2022-05-04 DIAGNOSIS — Z00.00 ADULT GENERAL MEDICAL EXAM: ICD-10-CM

## 2022-05-04 DIAGNOSIS — E11.9 TYPE 2 DIABETES MELLITUS WITHOUT COMPLICATION, WITHOUT LONG-TERM CURRENT USE OF INSULIN (HCC): ICD-10-CM

## 2022-05-04 DIAGNOSIS — Z12.4 SCREENING FOR CERVICAL CANCER: ICD-10-CM

## 2022-05-04 DIAGNOSIS — Z12.11 SCREEN FOR COLON CANCER: ICD-10-CM

## 2022-05-04 LAB
ALBUMIN SERPL-MCNC: 4.3 G/DL (ref 3.4–5)
ALBUMIN/GLOB SERPL: 1.1 {RATIO} (ref 1–2)
ALP LIVER SERPL-CCNC: 70 U/L
ALT SERPL-CCNC: 36 U/L
ANION GAP SERPL CALC-SCNC: 7 MMOL/L (ref 0–18)
AST SERPL-CCNC: 18 U/L (ref 15–37)
BILIRUB SERPL-MCNC: 0.6 MG/DL (ref 0.1–2)
BUN BLD-MCNC: 12 MG/DL (ref 7–18)
BUN/CREAT SERPL: 12.1 (ref 10–20)
CALCIUM BLD-MCNC: 10.1 MG/DL (ref 8.5–10.1)
CHLORIDE SERPL-SCNC: 105 MMOL/L (ref 98–112)
CHOLEST SERPL-MCNC: 170 MG/DL (ref ?–200)
CO2 SERPL-SCNC: 28 MMOL/L (ref 21–32)
CREAT BLD-MCNC: 0.99 MG/DL
CREAT UR-SCNC: 237 MG/DL
EST. AVERAGE GLUCOSE BLD GHB EST-MCNC: 146 MG/DL (ref 68–126)
FASTING PATIENT LIPID ANSWER: YES
FASTING STATUS PATIENT QL REPORTED: YES
GLOBULIN PLAS-MCNC: 4 G/DL (ref 2.8–4.4)
GLUCOSE BLD-MCNC: 106 MG/DL (ref 70–99)
HBA1C MFR BLD: 6.7 % (ref ?–5.7)
HDLC SERPL-MCNC: 84 MG/DL (ref 40–59)
HYALINE CASTS #/AREA URNS AUTO: PRESENT /LPF
LDLC SERPL CALC-MCNC: 62 MG/DL (ref ?–100)
MICROALBUMIN UR-MCNC: 2.17 MG/DL
MICROALBUMIN/CREAT 24H UR-RTO: 9.2 UG/MG (ref ?–30)
NONHDLC SERPL-MCNC: 86 MG/DL (ref ?–130)
OSMOLALITY SERPL CALC.SUM OF ELEC: 290 MOSM/KG (ref 275–295)
POTASSIUM SERPL-SCNC: 3.9 MMOL/L (ref 3.5–5.1)
PROT SERPL-MCNC: 8.3 G/DL (ref 6.4–8.2)
SODIUM SERPL-SCNC: 140 MMOL/L (ref 136–145)
TRIGL SERPL-MCNC: 144 MG/DL (ref 30–149)
TSI SER-ACNC: 2.02 MIU/ML (ref 0.36–3.74)
VLDLC SERPL CALC-MCNC: 21 MG/DL (ref 0–30)

## 2022-05-04 PROCEDURE — 99396 PREV VISIT EST AGE 40-64: CPT | Performed by: INTERNAL MEDICINE

## 2022-05-04 PROCEDURE — 82570 ASSAY OF URINE CREATININE: CPT

## 2022-05-04 PROCEDURE — 3075F SYST BP GE 130 - 139MM HG: CPT | Performed by: INTERNAL MEDICINE

## 2022-05-04 PROCEDURE — 36415 COLL VENOUS BLD VENIPUNCTURE: CPT

## 2022-05-04 PROCEDURE — 80061 LIPID PANEL: CPT

## 2022-05-04 PROCEDURE — 85027 COMPLETE CBC AUTOMATED: CPT

## 2022-05-04 PROCEDURE — 84443 ASSAY THYROID STIM HORMONE: CPT

## 2022-05-04 PROCEDURE — 77063 BREAST TOMOSYNTHESIS BI: CPT | Performed by: INTERNAL MEDICINE

## 2022-05-04 PROCEDURE — 82043 UR ALBUMIN QUANTITATIVE: CPT

## 2022-05-04 PROCEDURE — 3079F DIAST BP 80-89 MM HG: CPT | Performed by: INTERNAL MEDICINE

## 2022-05-04 PROCEDURE — 83036 HEMOGLOBIN GLYCOSYLATED A1C: CPT

## 2022-05-04 PROCEDURE — 77067 SCR MAMMO BI INCL CAD: CPT | Performed by: INTERNAL MEDICINE

## 2022-05-04 PROCEDURE — 3061F NEG MICROALBUMINURIA REV: CPT | Performed by: INTERNAL MEDICINE

## 2022-05-04 PROCEDURE — 80053 COMPREHEN METABOLIC PANEL: CPT

## 2022-05-04 PROCEDURE — 3008F BODY MASS INDEX DOCD: CPT | Performed by: INTERNAL MEDICINE

## 2022-05-04 PROCEDURE — 85060 BLOOD SMEAR INTERPRETATION: CPT

## 2022-05-04 PROCEDURE — 3044F HG A1C LEVEL LT 7.0%: CPT | Performed by: INTERNAL MEDICINE

## 2022-05-04 PROCEDURE — 81015 MICROSCOPIC EXAM OF URINE: CPT

## 2022-05-04 RX ORDER — ROSUVASTATIN CALCIUM 5 MG/1
5 TABLET, COATED ORAL NIGHTLY
Qty: 90 TABLET | Refills: 3 | Status: SHIPPED | OUTPATIENT
Start: 2022-05-04

## 2022-05-04 RX ORDER — LOSARTAN POTASSIUM 50 MG/1
50 TABLET ORAL DAILY
Qty: 90 TABLET | Refills: 2 | Status: SHIPPED | OUTPATIENT
Start: 2022-05-04 | End: 2022-08-02

## 2022-05-05 LAB
DEPRECATED RDW RBC AUTO: 44.5 FL (ref 35.1–46.3)
ERYTHROCYTE [DISTWIDTH] IN BLOOD BY AUTOMATED COUNT: 14 % (ref 11–15)
HCT VFR BLD AUTO: 49.9 %
HGB BLD-MCNC: 15.5 G/DL
MCH RBC QN AUTO: 26.8 PG (ref 26–34)
MCHC RBC AUTO-ENTMCNC: 31.1 G/DL (ref 31–37)
MCV RBC AUTO: 86.3 FL
PLATELET # BLD AUTO: 275 10(3)UL (ref 150–450)
RBC # BLD AUTO: 5.78 X10(6)UL
WBC # BLD AUTO: 8 X10(3) UL (ref 4–11)

## 2022-07-05 RX ORDER — AMLODIPINE BESYLATE 2.5 MG/1
TABLET ORAL
Qty: 90 TABLET | Refills: 0 | OUTPATIENT
Start: 2022-07-05

## 2022-07-05 NOTE — TELEPHONE ENCOUNTER
Amlodipine was changed to losartan to the last office visit. Please double check with the patient.   Thank you

## 2022-07-05 NOTE — TELEPHONE ENCOUNTER
OFFICE VISIT      Patient: Brielle Johnson Date of Service: 2020   : 1951 MRN: [id number]     SUBJECTIVE:     Chief Complaint   Patient presents with   • Physical       HISTORY OF PRESENT ILLNESS:  Brielle Johnson is a 68 year old female who presents today for a physical.     Health maintenance:  Did cologuard in 10/19; negative. Got the flu shot.  UTD on mammogram.    Abnormal findings on breast imaging:  Due for repeat U/S.    Anxiety:  She needs a refill on the alprazolam.  Having problems with her son, who lives with her.  Went to support group in [facility].      Right hip labral tear/gluteus minimus tendon tear:  Had a lot of problems after steroid injection into right hip by Dr. Garay.  She has a labral tear and gluteus medius tear.  BP shot up after steroid injection; had to call the ambulance.  Ended up at [facility].  Plans to f/u with him again.  Wants to try physical therapy.    Migraine headaches:  Takes sumitriptan as needed.     PAST MEDICAL HISTORY:  Past Medical History:   Diagnosis Date   • BPPV (benign paroxysmal positional vertigo)    • Essential (primary) hypertension    • GERD (gastroesophageal reflux disease)        MEDICATIONS:  Current Outpatient Medications   Medication Sig   • dexamethasone 0.4 % (compounded) solution [facility]: Contact [place] for compounding recipe, if needed.   • ALPRAZolam (XANAX) 0.5 MG tablet Take 1 tablet by mouth daily as needed for Anxiety.   • dicyclomine (BENTYL) 20 MG tablet Take 20 mg by mouth 4 times daily (before meals and nightly).   • pantoprazole (PROTONIX) 40 MG tablet Take 1 tablet by mouth daily.   • sumatriptan (IMITREX) 100 MG tablet Take 1 tablet by mouth as needed for Migraine. May repeat in two hours once if headache isn't gone.   NTE 2 doses in one day.     No current facility-administered medications for this visit.        ALLERGIES:  ALLERGIES:   Allergen Reactions   • Aciphex Sprinkle Other (See Comments)   • Celecoxib Other (See  The patient was called who states she is not on the Amlodipine anymore. She will call the pharmacy and have taken off automatic refill. Comments)     Unknown       PAST SURGICAL HISTORY:  Past Surgical History:   Procedure Laterality Date   • Arthrodesis      SPINAL   • Colonoscopy      cologuard    • Tubal ligation         FAMILY HISTORY:  Family History   Problem Relation Age of Onset   • Hypertension Mother    • Coronary Artery Disease Brother        SOCIAL HISTORY:  Social History     Tobacco Use   Smoking Status Never Smoker   Smokeless Tobacco Never Used     Social History     Substance and Sexual Activity   Alcohol Use Yes       Review of Systems   Constitutional: Negative.    Respiratory: Negative.    Cardiovascular: Negative.    Gastrointestinal: Negative.    All other systems reviewed and are negative.                Cognitive Assessment: no evidence of cognitive dysfunction by direct observation    Recent PHQ 2/9 Score    PHQ 2:  Date Adult PHQ 2 Score   1/30/2020 0       PHQ 9:       DEPRESSION ASSESSMENT/PLAN:  Depression screening is negative no further plan needed.       OBJECTIVE:     Visit Vitals  /74   Pulse 75   Temp 98.1 °F (36.7 °C) (Oral)   Resp 14   Ht 5' 1\" (1.549 m)   Wt 68.5 kg (151 lb)   BMI 28.53 kg/m²       Physical Exam   Constitutional: She is oriented to person, place, and time. She appears well-developed and well-nourished.   HENT:   Head: Normocephalic and atraumatic.   Right Ear: External ear normal.   Left Ear: External ear normal.   Nose: Nose normal.   Mouth/Throat: Oropharynx is clear and moist.   Eyes: Conjunctivae and EOM are normal. Left eye exhibits no discharge.   Neck: Neck supple. No JVD present. No thyromegaly present.   Cardiovascular: Normal rate, regular rhythm, normal heart sounds and intact distal pulses.   No murmur heard.  Pulmonary/Chest: Effort normal and breath sounds normal. No respiratory distress.   Abdominal: Bowel sounds are normal. She exhibits no mass. There is no tenderness. There is no rebound and no guarding. Musculoskeletal: Normal range of motion.     Lymphadenopathy:     She  has no cervical adenopathy.   Neurological: She is alert and oriented to person, place, and time.   Skin: Skin is warm and dry.   Psychiatric: She has a normal mood and affect. Her behavior is normal. Judgment and thought content normal.             DIAGNOSTIC STUDIES:   LAB RESULTS:    No visits with results within 1 Month(s) from this visit.   Latest known visit with results is:   Lab Services on 12/20/2019   Component Date Value Ref Range Status   • Gliadin Antibody IgG, Deamidated 12/20/2019 5  <20 UNITS Final   • IGG 12/20/2019 951  751 - 1,560 mg/dL Final   • IGA 12/20/2019 75* 82 - 453 mg/dL Final   • IGM 12/20/2019 94  46 - 304 mg/dL Final         ASSESSMENT AND PLAN:   This is a 68 year old year-old female who presents with     1. Abnormal finding on breast imaging    2. Health maintenance examination    3. B12 deficiency    4. Vitamin D deficiency    5. Hyperlipidemia, unspecified hyperlipidemia type    6. Anxiety    7. Tear of right acetabular labrum, subsequent encounter    8. Tear of right gluteus minimus tendon, subsequent encounter      1.  Health maintenance exam: Check labs as ordered.  She is up-to-date on colonoscopy screening.  Up-to-date on mammogram.  Up-to-date on flu shot but no pneumonia shots are listed.  Need to ask her about pneumococcal vaccination at follow-up.    2.  Abnormal finding on breast imaging: She is due for 6-month follow-up breast ultrasounds.  These were ordered today.    3.  Vitamin B12 deficiency: She was getting B12 injections in the past.  Check vitamin B12 level.    4.  Vitamin D deficiency: Check vitamin D level.    5.  Hyperlipidemia: Not on meds.  Check lipids.    6.  Anxiety: Alprazolam was refilled for as needed use.  [place] prescription drug monitoring site was checked.  There have been no duplicate prescriptions within the past 30 days.    7.  Tear of right labrum/tear of right gluteus minimus tendon: Refer to physical therapy.  Follow-up with Dr. Esther shen  symptoms fail to improve.        Instructions provided as documented in the AVS.  Medication use,effects and side effects discussed in detail with patient.  The patient indicated understanding of the diagnosis and agreed with the plan of care.  Medical compliance with plan discussed and risks of non-compliance reviewed.    Patient education completed on disease process, etiology & prognosis.    Patient expresses understanding of the plan.    Proper usage and side effects of medications reviewed & discussed.    Refer to orders.    Return to clinic as clinically indicated as discussed with patient who verbalized understanding of & agreement with the plan.    Entered by Dr. Coretta Brunner MD acting as scribe for MD Coretta Cabrera MD

## 2022-09-20 ENCOUNTER — OFFICE VISIT (OUTPATIENT)
Dept: GASTROENTEROLOGY | Facility: CLINIC | Age: 62
End: 2022-09-20

## 2022-09-20 ENCOUNTER — TELEPHONE (OUTPATIENT)
Dept: GASTROENTEROLOGY | Facility: CLINIC | Age: 62
End: 2022-09-20

## 2022-09-20 VITALS
WEIGHT: 138 LBS | BODY MASS INDEX: 23.56 KG/M2 | SYSTOLIC BLOOD PRESSURE: 144 MMHG | HEART RATE: 89 BPM | DIASTOLIC BLOOD PRESSURE: 86 MMHG | HEIGHT: 64 IN

## 2022-09-20 DIAGNOSIS — Z12.11 COLON CANCER SCREENING: Primary | ICD-10-CM

## 2022-09-20 PROCEDURE — 3008F BODY MASS INDEX DOCD: CPT | Performed by: NURSE PRACTITIONER

## 2022-09-20 PROCEDURE — 3077F SYST BP >= 140 MM HG: CPT | Performed by: NURSE PRACTITIONER

## 2022-09-20 PROCEDURE — S0285 CNSLT BEFORE SCREEN COLONOSC: HCPCS | Performed by: NURSE PRACTITIONER

## 2022-09-20 PROCEDURE — 3079F DIAST BP 80-89 MM HG: CPT | Performed by: NURSE PRACTITIONER

## 2022-09-20 RX ORDER — SODIUM, POTASSIUM,MAG SULFATES 17.5-3.13G
SOLUTION, RECONSTITUTED, ORAL ORAL
Qty: 2880 ML | Refills: 0 | Status: SHIPPED | OUTPATIENT
Start: 2022-09-20

## 2022-09-20 RX ORDER — POLYETHYLENE GLYCOL 3350, SODIUM CHLORIDE, SODIUM BICARBONATE, POTASSIUM CHLORIDE 420; 11.2; 5.72; 1.48 G/4L; G/4L; G/4L; G/4L
POWDER, FOR SOLUTION ORAL
Qty: 4000 ML | Refills: 0 | Status: SHIPPED | OUTPATIENT
Start: 2022-09-20

## 2022-09-20 NOTE — PATIENT INSTRUCTIONS
1. Schedule colonoscopy with MAC w/ Dr. Anne Marie Mayo [Diagnosis: crc screening]    2.  bowel prep from pharmacy (split trilyte)    3. Continue all medications for procedure    4. Read all bowel prep instructions carefully    5. AVOID seeds, nuts, popcorn, raw fruits and vegetables (cooked is okay) for 2-3 days before procedure    6. You will need to be tested for COVID within 72 hours of your procedure. You will be contacted with instructions on how to do this.       >>>Please note: if you were prescribed Suprep for the bowel prep and it is too expensive or not covered by insurance, it is okay to substitute Trilyte (or any similar generic prep). This can be done by notifying the pharmacy or calling our office.
vanco resistant enterococcus

## 2022-09-20 NOTE — TELEPHONE ENCOUNTER
Scheduled for:  Colonoscopy 71438  Provider Name:  Dr Mt Aceves  Date:  11/16/2022  Location:  [facility]  Sedation:  MAC  Time:  0830 (pt is aware to arrive at 0730)  Prep:  Colyte  Meds/Allergies Reconciled?: Adamaris/NP reviewed. Diagnosis with codes:  CCS Z12.11  Was patient informed to call insurance with codes (Y/N):  Y     Referral sent?:  NA  [facility] or [id number]  notified?:  I sent an electronic request to Endo Scheduling and received a confirmation today. Medication Orders: Pt is aware to NOT take iron pills, herbal meds and diet supplements for 7 days before exam. Also to NOT take any form of alcohol, recreational drugs and any forms of ED meds 24 hours before exam.       Misc Orders:       Further instructions given by staff:  I discussed the prep intructions with the patient in office which she verbally understood. Copy of instructions was handed to patient as well. Patient was also advised he will receive a call from PAT nurse 72-24 hours prior procedure to schedule Covid test done.

## 2022-09-22 ENCOUNTER — TELEPHONE (OUTPATIENT)
Dept: GASTROENTEROLOGY | Facility: CLINIC | Age: 62
End: 2022-09-22

## 2022-09-22 NOTE — TELEPHONE ENCOUNTER
Adamaris    Patient has orders for both trilyte and suprep. Noe calling to clarify the suprep order quantity. Do you want this order for suprep canceled since orders say split trilyte?     Thank you

## 2022-09-23 NOTE — TELEPHONE ENCOUNTER
Pending aprn response upon return to office Monday. This is not urgent. Colonoscopy scheduled for 11/16/2022.

## 2022-09-26 NOTE — TELEPHONE ENCOUNTER
Spoke with Anell Brunner, pharmacist at [facility]. Verified quantity for Suprep. OK to dispense both and give patient the option to choose based on cost.     No further questions/concerns at present.

## 2022-09-26 NOTE — TELEPHONE ENCOUNTER
Nursing:  The patient wanted to choose between the two options. The pharmacy can cancel whatever prep the patient doesn't want. Thanks.   Tarun Gallegos

## 2022-11-11 RX ORDER — AMLODIPINE BESYLATE 5 MG/1
5 TABLET ORAL DAILY
COMMUNITY

## 2022-11-16 ENCOUNTER — HOSPITAL ENCOUNTER (OUTPATIENT)
Age: 62
Setting detail: HOSPITAL OUTPATIENT SURGERY
Discharge: HOME | End: 2022-11-16
Attending: INTERNAL MEDICINE | Admitting: INTERNAL MEDICINE
Payer: COMMERCIAL

## 2022-11-16 ENCOUNTER — ANESTHESIA (OUTPATIENT)
Dept: ENDOSCOPY | Age: 62
End: 2022-11-16
Payer: COMMERCIAL

## 2022-11-16 ENCOUNTER — ANESTHESIA EVENT (OUTPATIENT)
Dept: ENDOSCOPY | Age: 62
End: 2022-11-16
Payer: COMMERCIAL

## 2022-11-16 VITALS
SYSTOLIC BLOOD PRESSURE: 118 MMHG | HEIGHT: 64 IN | HEART RATE: 79 BPM | WEIGHT: 135 LBS | DIASTOLIC BLOOD PRESSURE: 84 MMHG | BODY MASS INDEX: 23.05 KG/M2 | OXYGEN SATURATION: 97 % | RESPIRATION RATE: 17 BRPM

## 2022-11-16 DIAGNOSIS — Z12.11 COLON CANCER SCREENING: ICD-10-CM

## 2022-11-16 PROCEDURE — 88305 TISSUE EXAM BY PATHOLOGIST: CPT | Performed by: INTERNAL MEDICINE

## 2022-11-16 RX ORDER — SODIUM CHLORIDE, SODIUM LACTATE, POTASSIUM CHLORIDE, CALCIUM CHLORIDE 600; 310; 30; 20 MG/100ML; MG/100ML; MG/100ML; MG/100ML
INJECTION, SOLUTION INTRAVENOUS CONTINUOUS PRN
Status: DISCONTINUED | OUTPATIENT
Start: 2022-11-16 | End: 2022-11-16 | Stop reason: SURG

## 2022-11-16 RX ORDER — LIDOCAINE HYDROCHLORIDE 10 MG/ML
INJECTION, SOLUTION EPIDURAL; INFILTRATION; INTRACAUDAL; PERINEURAL AS NEEDED
Status: DISCONTINUED | OUTPATIENT
Start: 2022-11-16 | End: 2022-11-16 | Stop reason: SURG

## 2022-11-16 RX ADMIN — SODIUM CHLORIDE, SODIUM LACTATE, POTASSIUM CHLORIDE, CALCIUM CHLORIDE: 600; 310; 30; 20 INJECTION, SOLUTION INTRAVENOUS at 08:45:00

## 2022-11-16 RX ADMIN — LIDOCAINE HYDROCHLORIDE 50 MG: 10 INJECTION, SOLUTION EPIDURAL; INFILTRATION; INTRACAUDAL; PERINEURAL at 08:45:00

## 2022-11-16 NOTE — DISCHARGE INSTRUCTIONS

## 2022-11-16 NOTE — ANESTHESIA POSTPROCEDURE EVALUATION
Patient: Mario Mckinney    Procedure Summary     Date: 11/16/22 Room / Location: [facility] ENDOSCOPY 01 / [facility] ENDO    Anesthesia Start: 0845 Anesthesia Stop: 8869    Procedure: COLONOSCOPY Diagnosis:       Colon cancer screening      (polyps, hemorrhoids, diverticulosis,colitis)    Surgeons: Stefanie Drake MD Anesthesiologist:     Anesthesia Type: MAC ASA Status: 2          Anesthesia Type: MAC    Vitals Value Taken Time   /69 11/16/22 0906   Temp  11/16/22 0907   Pulse 86 11/16/22 0906   Resp 16 11/16/22 0906   SpO2 98 % 11/16/22 0906       [facility] AN Post Evaluation:   Patient Evaluated in PACU  Patient Participation: complete - patient participated  Level of Consciousness: awake and alert  Pain Management: adequate  Airway Patency:patent  Yes    Cardiovascular Status: acceptable  Respiratory Status: acceptable  Postoperative Hydration acceptable      Veverly MD Georgina  11/16/2022 9:07 AM

## 2022-11-17 ENCOUNTER — MED REC SCAN ONLY (OUTPATIENT)
Dept: GASTROENTEROLOGY | Facility: CLINIC | Age: 62
End: 2022-11-17

## 2022-12-08 ENCOUNTER — TELEPHONE (OUTPATIENT)
Facility: CLINIC | Age: 62
End: 2022-12-08

## 2022-12-08 NOTE — TELEPHONE ENCOUNTER
----- Message from Champ Vaughan MD sent at 12/8/2022 11:28 AM CST -----  GI staff: please place recall for colonoscopy in 7 years

## 2022-12-08 NOTE — TELEPHONE ENCOUNTER
Health maintenance updated. Last colonoscopy done 11/16/2022, recall placed into Pt Outreach, next due on 11/2029 per Dr. Geneva Ling.

## 2023-01-09 RX ORDER — ROSUVASTATIN CALCIUM 5 MG/1
TABLET, COATED ORAL
Qty: 90 TABLET | Refills: 1 | Status: SHIPPED | OUTPATIENT
Start: 2023-01-09

## 2023-01-23 RX ORDER — LOSARTAN POTASSIUM 50 MG/1
50 TABLET ORAL
COMMUNITY
Start: 2022-10-08 | End: 2023-01-23

## 2023-01-23 RX ORDER — LOSARTAN POTASSIUM 50 MG/1
50 TABLET ORAL DAILY
Qty: 90 TABLET | Refills: 1 | Status: SHIPPED | OUTPATIENT
Start: 2023-01-23

## 2023-01-23 NOTE — TELEPHONE ENCOUNTER
Refill requested is Losartan - did not pull up from Current List of Medications.     Losartan 50MG Tablets  Take 1 Tablet (50MG) by mouth daily  Qty 90  Refill 1

## 2023-01-23 NOTE — TELEPHONE ENCOUNTER
Patient is following up on requested medication and was advised by pharmacy to contact PCP. Please advise.      Losartan

## 2023-01-23 NOTE — TELEPHONE ENCOUNTER
Please review. Protocol failed / No protocol. Requested Prescriptions   Pending Prescriptions Disp Refills    losartan 50 MG Oral Tab 90 tablet 1     Sig: Take 1 tablet (50 mg total) by mouth daily. Hypertensive Medications Protocol Failed - 1/23/2023 10:41 AM        Failed - CMP or BMP in past 6 months     No results found for this or any previous visit (from the past 4392 hour(s)).             Failed - In person appointment or virtual visit in the past 6 months     Recent Outpatient Visits              4 months ago Colon cancer screening    [facility], [address] [place] [address], [facility]    Office Visit    8 months ago Adult general medical exam    [facility], [address], Debbra Gaucher, MD    Office Visit    1 year ago Myopia of both eyes with astigmatism and presbyopia    Maddison Patton Robert, MD    Office Visit    2 years ago Type 2 diabetes mellitus without complication, without long-term current use of insulin ([address]    Darreld Hammond, Debbra Gaucher, MD    Office Visit    3 years ago Annual physical exam    Quirino Patton Kristeen Flies, MD    Office Visit                      Passed - In person appointment in the past 12 or next 3 months     Recent Outpatient Visits              4 months ago Colon cancer screening    [facility], [facility], Kristi Weston, WALE    Office Visit    8 months ago Adult general medical exam    [facility], [address], Debbra Gaucher, MD    Office Visit    1 year ago Myopia of both eyes with astigmatism and presbyopia    Gregor Patton MD    Office Visit    2 years ago Type 2 diabetes mellitus without complication, without long-term current use of insulin ([address], [place], [place]    Office Visit    3 years ago Annual physical exam    [address], [place]    Office Visit                      Passed - Last BP reading less than 140/90     BP Readings from Last 1 Encounters:  11/16/22 : 118/84              Passed - EGFRCR or GFRAA > 50     GFR Evaluation  GFRAA: 72 , resulted on 5/4/2022           Signed Prescriptions Disp Refills    losartan 50 MG Oral Tab       Sig: Take 50 mg by mouth.        There is no refill protocol information for this order            Recent Outpatient Visits              4 months ago Colon cancer screening    [facility], [address] [place] [address], Ben Lombardo, [employer] Visit    8 months ago Adult general medical exam    [address], [place], [place]    Office Visit    1 year ago Myopia of both eyes with astigmatism and presbyopia    [address], [place], MD    Office Visit    2 years ago Type 2 diabetes mellitus without complication, without long-term current use of insulin ([facility] [address]    [address], [place], [place]    Office Visit    3 years ago Annual physical exam    [address], [place]    Office Visit

## 2023-02-24 NOTE — TELEPHONE ENCOUNTER
Spoke with [employer] in [place] for Amlodipine Rx. Records show [facility] approved refill for [facility] in [facility] 11/15/18.     Spoke with patient and verified she requests meds to go to [facility] in [place] nasrin and ella
cigarettes

## 2023-05-05 ENCOUNTER — LAB ENCOUNTER (OUTPATIENT)
Dept: LAB | Facility: HOSPITAL | Age: 63
End: 2023-05-05
Attending: INTERNAL MEDICINE
Payer: COMMERCIAL

## 2023-05-05 ENCOUNTER — OFFICE VISIT (OUTPATIENT)
Facility: CLINIC | Age: 63
End: 2023-05-05

## 2023-05-05 VITALS
RESPIRATION RATE: 14 BRPM | HEIGHT: 64 IN | OXYGEN SATURATION: 98 % | SYSTOLIC BLOOD PRESSURE: 120 MMHG | HEART RATE: 88 BPM | WEIGHT: 130 LBS | DIASTOLIC BLOOD PRESSURE: 72 MMHG | BODY MASS INDEX: 22.2 KG/M2

## 2023-05-05 DIAGNOSIS — Z12.31 SCREENING MAMMOGRAM FOR BREAST CANCER: ICD-10-CM

## 2023-05-05 DIAGNOSIS — Z00.00 ADULT GENERAL MEDICAL EXAM: Primary | ICD-10-CM

## 2023-05-05 DIAGNOSIS — Z00.00 ADULT GENERAL MEDICAL EXAM: ICD-10-CM

## 2023-05-05 DIAGNOSIS — Z12.4 PAP SMEAR FOR CERVICAL CANCER SCREENING: ICD-10-CM

## 2023-05-05 DIAGNOSIS — E11.9 TYPE 2 DIABETES MELLITUS WITHOUT COMPLICATION, WITHOUT LONG-TERM CURRENT USE OF INSULIN (HCC): ICD-10-CM

## 2023-05-05 LAB
ALBUMIN SERPL-MCNC: 4.3 G/DL (ref 3.4–5)
ALBUMIN/GLOB SERPL: 1.3 {RATIO} (ref 1–2)
ALP LIVER SERPL-CCNC: 65 U/L
ALT SERPL-CCNC: 32 U/L
ANION GAP SERPL CALC-SCNC: 6 MMOL/L (ref 0–18)
AST SERPL-CCNC: 19 U/L (ref 15–37)
BILIRUB SERPL-MCNC: 0.9 MG/DL (ref 0.1–2)
BUN BLD-MCNC: 17 MG/DL (ref 7–18)
BUN/CREAT SERPL: 22.7 (ref 10–20)
CALCIUM BLD-MCNC: 9.5 MG/DL (ref 8.5–10.1)
CHLORIDE SERPL-SCNC: 109 MMOL/L (ref 98–112)
CHOLEST SERPL-MCNC: 119 MG/DL (ref ?–200)
CO2 SERPL-SCNC: 26 MMOL/L (ref 21–32)
CREAT BLD-MCNC: 0.75 MG/DL
CREAT UR-SCNC: 270 MG/DL
DEPRECATED RDW RBC AUTO: 41.9 FL (ref 35.1–46.3)
ERYTHROCYTE [DISTWIDTH] IN BLOOD BY AUTOMATED COUNT: 13.5 % (ref 11–15)
EST. AVERAGE GLUCOSE BLD GHB EST-MCNC: 140 MG/DL (ref 68–126)
FASTING PATIENT LIPID ANSWER: YES
FASTING STATUS PATIENT QL REPORTED: YES
GFR SERPLBLD BASED ON 1.73 SQ M-ARVRAT: 91 ML/MIN/1.73M2 (ref 60–?)
GLOBULIN PLAS-MCNC: 3.2 G/DL (ref 2.8–4.4)
GLUCOSE BLD-MCNC: 114 MG/DL (ref 70–99)
HBA1C MFR BLD: 6.5 % (ref ?–5.7)
HCT VFR BLD AUTO: 42.6 %
HDLC SERPL-MCNC: 84 MG/DL (ref 40–59)
HGB BLD-MCNC: 13.6 G/DL
LDLC SERPL CALC-MCNC: 24 MG/DL (ref ?–100)
MCH RBC QN AUTO: 26.9 PG (ref 26–34)
MCHC RBC AUTO-ENTMCNC: 31.9 G/DL (ref 31–37)
MCV RBC AUTO: 84.4 FL
MICROALBUMIN UR-MCNC: 3.67 MG/DL
MICROALBUMIN/CREAT 24H UR-RTO: 13.6 UG/MG (ref ?–30)
NONHDLC SERPL-MCNC: 35 MG/DL (ref ?–130)
OSMOLALITY SERPL CALC.SUM OF ELEC: 294 MOSM/KG (ref 275–295)
PLATELET # BLD AUTO: 242 10(3)UL (ref 150–450)
POTASSIUM SERPL-SCNC: 3.9 MMOL/L (ref 3.5–5.1)
PROT SERPL-MCNC: 7.5 G/DL (ref 6.4–8.2)
RBC # BLD AUTO: 5.05 X10(6)UL
SODIUM SERPL-SCNC: 141 MMOL/L (ref 136–145)
TRIGL SERPL-MCNC: 42 MG/DL (ref 30–149)
TSI SER-ACNC: 0.84 MIU/ML (ref 0.36–3.74)
VLDLC SERPL CALC-MCNC: 5 MG/DL (ref 0–30)
WBC # BLD AUTO: 5.7 X10(3) UL (ref 4–11)

## 2023-05-05 PROCEDURE — 82570 ASSAY OF URINE CREATININE: CPT

## 2023-05-05 PROCEDURE — 36415 COLL VENOUS BLD VENIPUNCTURE: CPT

## 2023-05-05 PROCEDURE — 3074F SYST BP LT 130 MM HG: CPT | Performed by: INTERNAL MEDICINE

## 2023-05-05 PROCEDURE — 85027 COMPLETE CBC AUTOMATED: CPT

## 2023-05-05 PROCEDURE — 99396 PREV VISIT EST AGE 40-64: CPT | Performed by: INTERNAL MEDICINE

## 2023-05-05 PROCEDURE — 80061 LIPID PANEL: CPT

## 2023-05-05 PROCEDURE — 84443 ASSAY THYROID STIM HORMONE: CPT

## 2023-05-05 PROCEDURE — 3078F DIAST BP <80 MM HG: CPT | Performed by: INTERNAL MEDICINE

## 2023-05-05 PROCEDURE — 80053 COMPREHEN METABOLIC PANEL: CPT

## 2023-05-05 PROCEDURE — 83036 HEMOGLOBIN GLYCOSYLATED A1C: CPT

## 2023-05-05 PROCEDURE — 82043 UR ALBUMIN QUANTITATIVE: CPT

## 2023-05-05 PROCEDURE — 3008F BODY MASS INDEX DOCD: CPT | Performed by: INTERNAL MEDICINE

## 2023-07-14 RX ORDER — LOSARTAN POTASSIUM 50 MG/1
50 TABLET ORAL DAILY
Qty: 90 TABLET | Refills: 3 | Status: SHIPPED | OUTPATIENT
Start: 2023-07-14

## 2023-07-14 NOTE — TELEPHONE ENCOUNTER
Refill passed per [facility] protocol   Requested Prescriptions   Pending Prescriptions Disp Refills    LOSARTAN 50 MG Oral Tab [Pharmacy Med Name: LOSARTAN 50MG TABLETS] 90 tablet 1     Sig: TAKE 1 TABLET(50 MG) BY MOUTH DAILY       Hypertensive Medications Protocol Passed - 7/14/2023 12:45 PM        Passed - In person appointment in the past 12 or next 3 months     Recent Outpatient Visits              2 months ago Adult general medical exam    Saúl Arboleda MD    Office Visit    9 months ago Colon cancer screening    [facility] Margaretann Bang, APRN    Office Visit    1 year ago Adult general medical exam    [facility], [address], MD Saúl    Office Visit    2 years ago Myopia of both eyes with astigmatism and presbyopia    Maddison Major, Martha Salter MD    Office Visit    2 years ago Type 2 diabetes mellitus without complication, without long-term current use of insulin ([facility] [address]    [facility], [address], MD Saúl    Office Visit                      Passed - Last BP reading less than 140/90     BP Readings from Last 1 Encounters:  05/05/23 : 120/72              Passed - CMP or BMP in past 6 months     Recent Results (from the past 4392 hour(s))   COMP METABOLIC PANEL (14)    Collection Time: 05/05/23 11:50 AM   Result Value Ref Range    Glucose 114 (H) 70 - 99 mg/dL    Sodium 141 136 - 145 mmol/L    Potassium 3.9 3.5 - 5.1 mmol/L    Chloride 109 98 - 112 mmol/L    CO2 26.0 21.0 - 32.0 mmol/L    Anion Gap 6 0 - 18 mmol/L    BUN 17 7 - 18 mg/dL    Creatinine 0.75 0.55 - 1.02 mg/dL    BUN/CREA Ratio 22.7 (H) 10.0 - 20.0    Calcium, Total 9.5 8.5 - 10.1 mg/dL    Calculated Osmolality 294 275 - 295 mOsm/kg    eGFR-Cr 91 >=60 mL/min/1.73m2    ALT 32 13 - 56 U/L    AST 19 15 - 37 U/L Alkaline Phosphatase 65 46 - 118 U/L    Bilirubin, Total 0.9 0.1 - 2.0 mg/dL    Total Protein 7.5 6.4 - 8.2 g/dL    Albumin 4.3 3.4 - 5.0 g/dL    Globulin  3.2 2.8 - 4.4 g/dL    A/G Ratio 1.3 1.0 - 2.0    Patient Fasting for CMP? Yes      *Note: Due to a large number of results and/or encounters for the requested time period, some results have not been displayed. A complete set of results can be found in Results Review.                Passed - In person appointment or virtual visit in the past 6 months     Recent Outpatient Visits              2 months ago Adult general medical exam    [address], [address] [place] [address], MD Saúl    Office Visit    9 months ago Colon cancer screening    [facility], [address] [place] [address], Meliton Lombardo APRN    Office Visit    1 year ago Adult general medical exam    [address] Sa[address] Val[address] MD    Office Visit    2 years ago Myopia of both eyes with astigmatism and presbyopia    [address] Robert, MD    Office Visit    2 years ago Type 2 diabetes mellitus without complication, without long-term current use of insulin [facility])    [address] Sa[address]l Valdez MD    Office Visit                      Passed - EGFRCR or GFRAA > 50     GFR Evaluation  EGFRCR: 91 , resulted on 5/5/2023

## 2023-07-20 ENCOUNTER — OFFICE VISIT (OUTPATIENT)
Dept: INTERNAL MEDICINE CLINIC | Facility: CLINIC | Age: 63
End: 2023-07-20

## 2023-07-20 VITALS
HEART RATE: 79 BPM | BODY MASS INDEX: 21.85 KG/M2 | SYSTOLIC BLOOD PRESSURE: 150 MMHG | DIASTOLIC BLOOD PRESSURE: 90 MMHG | HEIGHT: 64 IN | WEIGHT: 128 LBS

## 2023-07-20 DIAGNOSIS — I10 ESSENTIAL HYPERTENSION: ICD-10-CM

## 2023-07-20 DIAGNOSIS — Z12.4 CERVICAL CANCER SCREENING: Primary | ICD-10-CM

## 2023-07-20 PROCEDURE — 99213 OFFICE O/P EST LOW 20 MIN: CPT

## 2023-07-20 PROCEDURE — 3080F DIAST BP >= 90 MM HG: CPT

## 2023-07-20 PROCEDURE — 3008F BODY MASS INDEX DOCD: CPT

## 2023-07-20 PROCEDURE — 3077F SYST BP >= 140 MM HG: CPT

## 2023-07-21 LAB — HPV I/H RISK 1 DNA SPEC QL NAA+PROBE: NEGATIVE

## 2024-01-02 ENCOUNTER — TELEPHONE (OUTPATIENT)
Dept: FAMILY MEDICINE CLINIC | Facility: CLINIC | Age: 64
End: 2024-01-02

## 2024-01-02 NOTE — TELEPHONE ENCOUNTER
Left detailed vm for patient to call back and schedule DM eye exam, also provided information on how to fax results if done at another facility. A [employer] message was also sent with this information.

## 2024-03-08 RX ORDER — ROSUVASTATIN CALCIUM 5 MG/1
5 TABLET, COATED ORAL NIGHTLY
Qty: 90 TABLET | Refills: 3 | Status: SHIPPED | OUTPATIENT
Start: 2024-03-08

## 2024-03-08 NOTE — TELEPHONE ENCOUNTER
Refill passed per [facility] protocol.     Requested Prescriptions   Pending Prescriptions Disp Refills    ROSUVASTATIN 5 MG Oral Tab [Pharmacy Med Name: ROSUVASTATIN CALCIUM 5 MG TAB] 90 tablet 1     Sig: TAKE 1 TABLET BY MOUTH EVERYDAY AT BEDTIME       Cholesterol Medication Protocol Passed - 3/8/2024  1:04 PM        Passed - ALT < 80     Lab Results   Component Value Date    ALT 32 05/05/2023             Passed - ALT resulted within past year        Passed - Lipid panel within past 12 months     Lab Results   Component Value Date    CHOLEST 119 05/05/2023    TRIG 42 05/05/2023    HDL 84 (H) 05/05/2023    LDL 24 05/05/2023    VLDL 5 05/05/2023    NONHDLC 35 05/05/2023             Passed - In person appointment or virtual visit in the past 12 mos or appointment in next 3 mos     Recent Outpatient Visits              7 months ago Cervical cancer screening    [facility] Alexandra Wiggins APRN    Office Visit    10 months ago Adult general medical exam    [facility] Colt Delgado MD    Office Visit    1 year ago Colon cancer screening    [facility] Adamaris Barrera APRN    Office Visit    1 year ago Adult general medical exam    [facility]t Del[facility]    Office Visit    3 years ago Myopia of both eyes with astigmatism and presbyopia    [facility] Cam Quijano MD    Office Visit                           [unfilled]      @[email or web address]@

## 2024-03-08 NOTE — TELEPHONE ENCOUNTER
Refill passed per [facility] protocol.     Requested Prescriptions   Pending Prescriptions Disp Refills    ROSUVASTATIN 5 MG Oral Tab [Pharmacy Med Name: ROSUVASTATIN CALCIUM 5 MG TAB] 90 tablet 1     Sig: TAKE 1 TABLET BY MOUTH EVERYDAY AT BEDTIME       Cholesterol Medication Protocol Passed - 3/8/2024  1:04 PM        Passed - ALT < 80     Lab Results   Component Value Date    ALT 32 05/05/2023             Passed - ALT resulted within past year        Passed - Lipid panel within past 12 months     Lab Results   Component Value Date    CHOLEST 119 05/05/2023    TRIG 42 05/05/2023    HDL 84 (H) 05/05/2023    LDL 24 05/05/2023    VLDL 5 05/05/2023    NONHDLC 35 05/05/2023             Passed - In person appointment or virtual visit in the past 12 mos or appointment in next 3 mos     Recent Outpatient Visits              7 months ago Cervical cancer screening    [facility] Alexandra Wiggins APRN    Office Visit    10 months ago Adult general medical exam    [facility] Colt Delgado MD    Office Visit    1 year ago Colon cancer screening    [facility] Adamaris Barrera APRN    Office Visit    1 year ago Adult general medical exam    [facility] Colt Delgado MD    Office Visit    3 years ago Myopia of both eyes with astigmatism and presbyopia    [facility] Cam Quijano MD    Office Visit                           [unfilled]      @[email or web address]@

## 2024-10-25 ENCOUNTER — TELEPHONE (OUTPATIENT)
Dept: INTERNAL MEDICINE CLINIC | Facility: CLINIC | Age: 64
End: 2024-10-25

## 2024-10-25 NOTE — TELEPHONE ENCOUNTER
Pt stated in 1/2024 that she currently does not have insurance and will follow up when she gets insurance again

## 2025-04-11 ENCOUNTER — TELEPHONE (OUTPATIENT)
Dept: INTERNAL MEDICINE CLINIC | Facility: CLINIC | Age: 65
End: 2025-04-11

## 2025-06-19 ENCOUNTER — TELEPHONE (OUTPATIENT)
Dept: INTERNAL MEDICINE CLINIC | Facility: CLINIC | Age: 65
End: 2025-06-19

## (undated) DEVICE — KIT ENDO ORCAPOD 160/180/190

## (undated) DEVICE — LINE MNTR ADLT SET O2 INTMD

## (undated) DEVICE — MEDI-VAC NON-CONDUCTIVE SUCTION TUBING 6MM X 1.8M (6FT.) L: Brand: CARDINAL HEALTH

## (undated) DEVICE — FORCEP RADIAL JAW 4

## (undated) DEVICE — STERILE LATEX POWDER-FREE SURGICAL GLOVESWITH NITRILE COATING: Brand: PROTEXIS

## (undated) DEVICE — KIT CLEAN ENDOKIT 1.1OZ GOWNX2

## (undated) DEVICE — 35 ML SYRINGE REGULAR TIP: Brand: MONOJECT

## (undated) NOTE — LETTER
Yana Harden,      This is the [facility], office of Dr. Colt Delgado     Thank you for putting your trust in [facility]. Our goal is to deliver the highest quality healthcare and an exceptional patient experience. Upon reviewing of your medical record shows you are due for the following:     Annual Physical   Mammogram  Diabetic eye exam  Diabetic foot exam  Diabetic A1C check     Please call [phone] to schedule your appointment or schedule online via [employer].     If you changed to a new provider at another facility, please notify the clinic to update your records.     If you had any recent testing at another facility, please have your results faxed to our office at [phone].      Thank you and have a great day! 04/11/25

## (undated) NOTE — LETTER
[address]  Authorization for Surgical Operation and Procedure                                                                                           1. I hereby Taisha Hill MD, my physician and his/her assistants (if applicable), which may include medical students, residents, and/or fellows, to perform the following surgical operation/ procedure and administer such anesthesia as may be determined necessary by my physician: Operation/Procedure name (s) COLONOSCOPY on [address]   2. I recognize that during the surgical operation/procedure, unforeseen conditions may necessitate additional or different procedures than those listed above. I, therefore, further authorize and request that the above-named surgeon, assistants, or designees perform such procedures as are, in their judgment, necessary and desirable. 3.   My surgeon/physician has discussed prior to my surgery the potential benefits, risks and side effects of this procedure; the likelihood of achieving goals; and potential problems that might occur during recuperation. They also discussed reasonable alternatives to the procedure, including risks, benefits, and side effects related to the alternatives and risks related to not receiving this procedure. I have had all my questions answered and I acknowledge that no guarantee has been made as to the result that may be obtained. 4.   Should the need arise during my operation/procedure, which includes change of level of care prior to discharge, I also consent to the administration of blood and/or blood products. Further, I understand that despite careful testing and screening of blood or blood products by collecting agencies, I may still be subject to ill effects as a result of receiving a blood transfusion and/or blood products.   The following are some, but not all, of the potential risks that can occur: fever and allergic reactions, hemolytic reactions, transmission of diseases such as Hepatitis, AIDS and Cytomegalovirus (CMV) and fluid overload. In the event that I wish to have an autologous transfusion of my own blood, or a directed donor transfusion, I will discuss this with my physician. Check only if Refusing Blood or Blood Products  I understand refusal of blood or blood products as deemed necessary by my physician may have serious consequences to my condition to include possible death. I hereby assume responsibility for my refusal and release the hospital, its personnel, and my physicians from any responsibility for the consequences of my refusal.           ____ Refuse      5. I authorize the use of any specimen, organs, tissues, body parts or foreign objects that may be removed from my body during the operation/procedure for diagnosis, research or teaching purposes and their subsequent disposal by hospital authorities. I also authorize the release of specimen test results and/or written reports to my treating physician on the hospital medical staff or other referring or consulting physicians involved in my care, at the discretion of the Pathologist or my treating physician. 6.   I consent to the photographing or videotaping of the operations or procedures to be performed, including appropriate portions of my body for medical, scientific, or educational purposes, provided my identity is not revealed by the pictures or by descriptive texts accompanying them. If the procedure has been photographed/videotaped, the surgeon will obtain the original picture, image, videotape or CD. The hospital will not be responsible for storage, release or maintenance of the picture, image, tape or CD.    7.   I consent to the presence of a  or observers in the operating room as deemed necessary by my physician or their designees.     8.   I recognize that in the event my procedure results in extended X-Ray/fluoroscopy time, I may develop a skin reaction. 9. If I have a Do Not Attempt Resuscitation (DNAR) order in place, that status will be suspended while in the operating room, procedural suite, and during the recovery period unless otherwise explicitly stated by me (or a person authorized to consent on my behalf). The surgeon or my attending physician will determine when the applicable recovery period ends for purposes of reinstating the DNAR order. 10. Patients having a sterilization procedure: I understand that if the procedure is successful the results will be permanent and it will therefore be impossible for me to inseminate, conceive, or bear children. I also understand that the procedure is intended to result in sterility, although the result has not been guaranteed. 11. I acknowledge that my physician has explained sedation/analgesia administration to me including the risk and benefits I consent to the administration of sedation/analgesia as may be necessary or desirable in the judgment of my physician. I CERTIFY THAT I HAVE READ AND FULLY UNDERSTAND THE ABOVE CONSENT TO OPERATION and/or OTHER PROCEDURE.     _________________________________________ _________________________________     ___________________________________  Signature of Patient     Signature of Responsible Person                   Printed Name of Responsible Person                              _________________________________________ ______________________________        ___________________________________  Signature of Witness         Date  Time         Relationship to Patient    STATEMENT OF PHYSICIAN My signature below affirms that prior to the time of the procedure; I have explained to the patient and/or his/her legal representative, the risks and benefits involved in the proposed treatment and any reasonable alternative to the proposed treatment.  I have also explained the risks and benefits involved in refusal of the proposed treatment and alternatives to the proposed treatment and have answered the patient's questions.  If I have a significant financial interest in a co-management agreement or a significant financial interest in any product or implant, or other significant relationship used in this procedure/surgery, I have disclosed this and had a discussion with my patient.     _______________________________________________________________ _____________________________  Mikayla Javed)                                                                                         (Date)                                   (Time)  Patient Name: Luis Harley    : 1960   Printed: 11/15/2022      Medical Record #: [id number]                                              Page 1 of 1

## (undated) NOTE — LETTER
February 10, 2021    Yahaira Walton MD  [address]     Patient: Emilee Braga   YOB: 1960   Date of Visit: 2/10/2021       Dear Dr. Korin Hinds MD:    Thank you for referring Crisoforo Harada to me for evaluation.  Here i Negative for: Constitutional, Gastrointestinal, Neurological, Skin, Genitourinary, Musculoskeletal, HENT, Endocrine, Cardiovascular, Respiratory, Psychiatric, Allergic/Imm, Heme/Lymph    Last edited by Genevieve Aaron O.T. on 2/10/2021  9:01 AM. (History Left +0.50 +1.25 150  20/20    Type: Reading only                 ASSESSMENT/PLAN:     Diagnoses and Plan:     Myopia of both eyes with astigmatism and presbyopia  RX for separate distance and reading glasses per patient's choice.        Prediabetes  Predi

## (undated) NOTE — LETTER
02/06/18        [place]  [address]   [address] [facility]      Dear Dagoberto Kramer,    [facility] records indicate that you have outstanding lab work and or testing that was ordered for you and has not yet been completed:          Vitamin D, 25-Hydro

## (undated) NOTE — LETTER
Hello,      This is the [facility], office of Dr. Colt Delgado     Thank you for putting your trust in [facility]. Our goal is to deliver the highest quality healthcare and an exceptional patient experience. Upon reviewing of your medical record shows you are due for the following:     Annual Physical   Mammogram  Diabetic eye exam  Diabetic foot exam  Diabetic A1C check     Please call [phone] to schedule your appointment or schedule online via [employer].     If you changed to a new provider at another facility, please notify the clinic to update your records.     If you had any recent testing at another facility, please have your results faxed to our office at [phone].      Thank you and have a great day!  06/19/25